# Patient Record
Sex: MALE | Race: WHITE | NOT HISPANIC OR LATINO | Employment: PART TIME | URBAN - METROPOLITAN AREA
[De-identification: names, ages, dates, MRNs, and addresses within clinical notes are randomized per-mention and may not be internally consistent; named-entity substitution may affect disease eponyms.]

---

## 2022-02-19 ENCOUNTER — HOSPITAL ENCOUNTER (EMERGENCY)
Facility: HOSPITAL | Age: 52
Discharge: HOME | End: 2022-02-19
Attending: EMERGENCY MEDICINE | Admitting: EMERGENCY MEDICINE
Payer: OTHER MISCELLANEOUS

## 2022-02-19 ENCOUNTER — APPOINTMENT (EMERGENCY)
Dept: RADIOLOGY | Facility: HOSPITAL | Age: 52
End: 2022-02-19
Payer: OTHER MISCELLANEOUS

## 2022-02-19 VITALS
OXYGEN SATURATION: 94 % | SYSTOLIC BLOOD PRESSURE: 157 MMHG | WEIGHT: 250 LBS | TEMPERATURE: 97.9 F | RESPIRATION RATE: 18 BRPM | DIASTOLIC BLOOD PRESSURE: 77 MMHG | BODY MASS INDEX: 40.18 KG/M2 | HEIGHT: 66 IN | HEART RATE: 73 BPM

## 2022-02-19 DIAGNOSIS — S43.014A ANTERIOR DISLOCATION OF RIGHT SHOULDER, INITIAL ENCOUNTER: Primary | ICD-10-CM

## 2022-02-19 PROCEDURE — 96374 THER/PROPH/DIAG INJ IV PUSH: CPT

## 2022-02-19 PROCEDURE — 99285 EMERGENCY DEPT VISIT HI MDM: CPT | Performed by: NURSE PRACTITIONER

## 2022-02-19 PROCEDURE — 73030 X-RAY EXAM OF SHOULDER: CPT

## 2022-02-19 PROCEDURE — 23650 CLTX SHO DSLC W/MNPJ WO ANES: CPT | Performed by: NURSE PRACTITIONER

## 2022-02-19 PROCEDURE — 96375 TX/PRO/DX INJ NEW DRUG ADDON: CPT

## 2022-02-19 PROCEDURE — 99283 EMERGENCY DEPT VISIT LOW MDM: CPT

## 2022-02-19 RX ORDER — AMLODIPINE BESYLATE 5 MG/1
5 TABLET ORAL DAILY
COMMUNITY

## 2022-02-19 RX ORDER — ONDANSETRON 2 MG/ML
4 INJECTION INTRAMUSCULAR; INTRAVENOUS ONCE
Status: COMPLETED | OUTPATIENT
Start: 2022-02-19 | End: 2022-02-19

## 2022-02-19 RX ORDER — HYDROMORPHONE HCL/PF 1 MG/ML
1 SYRINGE (ML) INJECTION ONCE
Status: COMPLETED | OUTPATIENT
Start: 2022-02-19 | End: 2022-02-19

## 2022-02-19 RX ADMIN — HYDROMORPHONE HYDROCHLORIDE 1 MG: 1 INJECTION, SOLUTION INTRAMUSCULAR; INTRAVENOUS; SUBCUTANEOUS at 16:46

## 2022-02-19 RX ADMIN — ONDANSETRON 4 MG: 2 INJECTION INTRAMUSCULAR; INTRAVENOUS at 16:45

## 2022-02-19 NOTE — ED ATTENDING ATTESTATION
2/19/2022  IMarek MD, saw and evaluated the patient  I have discussed the patient with the resident/non-physician practitioner and agree with the resident's/non-physician practitioner's findings, Plan of Care, and MDM as documented in the resident's/non-physician practitioner's note, except where noted  All available labs and Radiology studies were reviewed  I was present for key portions of any procedure(s) performed by the resident/non-physician practitioner and I was immediately available to provide assistance  At this point I agree with the current assessment done in the Emergency Department

## 2022-02-19 NOTE — Clinical Note
Amparo Dumont was seen and treated in our emergency department on 2/19/2022  Diagnosis:     Laron Dailey  may return to gym or sports with limited activity until return date  He may return on this date: 03/19/2022         If you have any questions or concerns, please don't hesitate to call        Madeline Rivas MD    ______________________________           _______________          _______________  Hospital Representative                              Date                                Time

## 2022-02-19 NOTE — ED PROVIDER NOTES
History  Chief Complaint   Patient presents with    Shoulder Injury     right shoulder injury while practicing for      49-year-old male patient presents here with right shoulder injury skiing today  He has what appears to be an apparent dislocation with a positive salcus sign  He is neurovascularly intact distally      Fall  Mechanism of injury: fall    Injury location:  Shoulder/arm  Shoulder/arm injury location:  R upper arm  Fall:     Fall occurred:  Skiing/snowboarding    Impact surface:  Snow      Prior to Admission Medications   Prescriptions Last Dose Informant Patient Reported? Taking? amLODIPine (NORVASC) 5 mg tablet   Yes Yes   Sig: Take 5 mg by mouth daily      Facility-Administered Medications: None       Past Medical History:   Diagnosis Date    Hypertension        History reviewed  No pertinent surgical history  History reviewed  No pertinent family history  I have reviewed and agree with the history as documented      E-Cigarette/Vaping    E-Cigarette Use Never User      E-Cigarette/Vaping Substances     Social History     Tobacco Use    Smoking status: Never Smoker    Smokeless tobacco: Never Used   Vaping Use    Vaping Use: Never used   Substance Use Topics    Alcohol use: Not Currently    Drug use: Never       Review of Systems    Physical Exam  Physical Exam    Vital Signs  ED Triage Vitals [02/19/22 1616]   Temperature Pulse Respirations Blood Pressure SpO2   97 9 °F (36 6 °C) 73 18 157/77 94 %      Temp Source Heart Rate Source Patient Position - Orthostatic VS BP Location FiO2 (%)   Temporal Monitor Sitting Left arm --      Pain Score       7           Vitals:    02/19/22 1616   BP: 157/77   Pulse: 73   Patient Position - Orthostatic VS: Sitting         Visual Acuity      ED Medications  Medications   ondansetron (ZOFRAN) injection 4 mg (4 mg Intravenous Given 2/19/22 1645)   HYDROmorphone (DILAUDID) injection 1 mg (1 mg Intravenous Given 2/19/22 1646)       Diagnostic Studies  Results Reviewed     None                 XR shoulder 2+ views RIGHT    (Results Pending)   XR shoulder 2+ views RIGHT    (Results Pending)              Procedures  Orthopedic injury treatment    Date/Time: 2/19/2022 5:30 PM  Performed by: REHAN Rosas  Authorized by: REHAN Rosas     Patient Location:  ED  Other Assisting Provider: Yes (comment)    Verbal consent obtained?: Yes    Emergent situation    Risks and benefits: Risks, benefits and alternatives were discussed    Consent given by:  Patient  Patient states understanding of procedure being performed: Yes    Patient identity confirmed:  Verbally with patient and arm band  Injury location:  Shoulder  Location details:  Right shoulder  Injury type:  Dislocation  Dislocation type: anterior    Chronicity:  Recurrent  Hill-Sachs deformity?: No    Neurovascular status: Neurovascularly intact    Distal perfusion: normal    Neurological function: normal    Range of motion: reduced    Local anesthesia used?: No    General anesthesia used?: No    Manipulation performed?: Yes    Reduction method: FARES  Reduction method: FARES  Reduction method: FARES  Reduction method: FARES  Reduction method: FARES  Reduction method: FARES  Reduction successful?: Yes    Confirmation: Reduction confirmed by x-ray    Immobilization:  Sling  Neurovascular status: Neurovascularly intact    Distal perfusion: normal    Neurological function: normal    Range of motion: normal    Patient tolerance:  Patient tolerated the procedure well with no immediate complications             ED Course                               SBIRT 20yo+      Most Recent Value   SBIRT (24 yo +)    In order to provide better care to our patients, we are screening all of our patients for alcohol and drug use  Would it be okay to ask you these screening questions?  No Filed at: 02/19/2022 1706                    MDM  Number of Diagnoses or Management Options  Anterior dislocation of right shoulder, initial encounter: new and requires workup     Amount and/or Complexity of Data Reviewed  Tests in the radiology section of CPT®: reviewed and ordered  Independent visualization of images, tracings, or specimens: yes    Patient Progress  Patient progress: improved      Disposition  Final diagnoses:   Anterior dislocation of right shoulder, initial encounter     Time reflects when diagnosis was documented in both MDM as applicable and the Disposition within this note     Time User Action Codes Description Comment    2/19/2022  5:23 PM Yary Byrne Add [S43 014A] Anterior dislocation of right shoulder, initial encounter       ED Disposition     ED Disposition Condition Date/Time Comment    Discharge Stable Sat Feb 19, 2022  5:23 PM Curtis Pardo discharge to home/self care  Follow-up Information     Follow up With Specialties Details Why [address], DO Orthopedic Surgery In 1 day  [address] [address]  [phone]            Discharge Medication List as of 2/19/2022  5:23 PM      CONTINUE these medications which have NOT CHANGED    Details   amLODIPine (NORVASC) 5 mg tablet Take 5 mg by mouth daily, Historical Med             No discharge procedures on file      PDMP Review     None          ED Provider  Electronically Signed by           REHAN Mckinley  02/19/22 1882

## 2022-02-26 ENCOUNTER — APPOINTMENT (EMERGENCY)
Dept: RADIOLOGY | Facility: HOSPITAL | Age: 52
End: 2022-02-26
Payer: OTHER MISCELLANEOUS

## 2022-02-26 ENCOUNTER — HOSPITAL ENCOUNTER (EMERGENCY)
Facility: HOSPITAL | Age: 52
Discharge: HOME | End: 2022-02-26
Attending: FAMILY MEDICINE | Admitting: FAMILY MEDICINE
Payer: OTHER MISCELLANEOUS

## 2022-02-26 VITALS
TEMPERATURE: 97.8 F | OXYGEN SATURATION: 96 % | RESPIRATION RATE: 18 BRPM | SYSTOLIC BLOOD PRESSURE: 181 MMHG | WEIGHT: 250 LBS | HEART RATE: 74 BPM | DIASTOLIC BLOOD PRESSURE: 97 MMHG | BODY MASS INDEX: 40.35 KG/M2

## 2022-02-26 DIAGNOSIS — S43.014A ANTERIOR DISLOCATION OF RIGHT SHOULDER, INITIAL ENCOUNTER: Primary | ICD-10-CM

## 2022-02-26 PROCEDURE — 73020 X-RAY EXAM OF SHOULDER: CPT

## 2022-02-26 PROCEDURE — 96374 THER/PROPH/DIAG INJ IV PUSH: CPT

## 2022-02-26 PROCEDURE — 99285 EMERGENCY DEPT VISIT HI MDM: CPT | Performed by: FAMILY MEDICINE

## 2022-02-26 PROCEDURE — 73030 X-RAY EXAM OF SHOULDER: CPT

## 2022-02-26 PROCEDURE — 96376 TX/PRO/DX INJ SAME DRUG ADON: CPT

## 2022-02-26 PROCEDURE — 23650 CLTX SHO DSLC W/MNPJ WO ANES: CPT | Performed by: FAMILY MEDICINE

## 2022-02-26 PROCEDURE — 99152 MOD SED SAME PHYS/QHP 5/>YRS: CPT | Performed by: FAMILY MEDICINE

## 2022-02-26 PROCEDURE — 99283 EMERGENCY DEPT VISIT LOW MDM: CPT

## 2022-02-26 RX ORDER — ETOMIDATE 2 MG/ML
5 INJECTION INTRAVENOUS ONCE
Status: DISCONTINUED | OUTPATIENT
Start: 2022-02-26 | End: 2022-02-26 | Stop reason: HOSPADM

## 2022-02-26 RX ORDER — MIDAZOLAM HYDROCHLORIDE 2 MG/2ML
5 INJECTION, SOLUTION INTRAMUSCULAR; INTRAVENOUS ONCE
Status: COMPLETED | OUTPATIENT
Start: 2022-02-26 | End: 2022-02-26

## 2022-02-26 RX ORDER — ETOMIDATE 2 MG/ML
INJECTION INTRAVENOUS
Status: DISCONTINUED
Start: 2022-02-26 | End: 2022-02-26 | Stop reason: HOSPADM

## 2022-02-26 RX ORDER — HYDROMORPHONE HCL/PF 1 MG/ML
1 SYRINGE (ML) INJECTION ONCE
Status: COMPLETED | OUTPATIENT
Start: 2022-02-26 | End: 2022-02-26

## 2022-02-26 RX ADMIN — MIDAZOLAM 5 MG: 1 INJECTION INTRAMUSCULAR; INTRAVENOUS at 13:13

## 2022-02-26 RX ADMIN — MIDAZOLAM HYDROCHLORIDE 5 MG: 1 INJECTION, SOLUTION INTRAMUSCULAR; INTRAVENOUS at 12:15

## 2022-02-26 RX ADMIN — HYDROMORPHONE HYDROCHLORIDE 1 MG: 1 INJECTION, SOLUTION INTRAMUSCULAR; INTRAVENOUS; SUBCUTANEOUS at 12:00

## 2022-02-26 RX ADMIN — HYDROMORPHONE HYDROCHLORIDE 1 MG: 1 INJECTION, SOLUTION INTRAMUSCULAR; INTRAVENOUS; SUBCUTANEOUS at 12:55

## 2022-02-26 NOTE — Clinical Note
Meg Pena was seen and treated in our emergency department on 2/26/2022  Diagnosis:     Boneta Big    He may return on this date: If you have any questions or concerns, please don't hesitate to call        Xenia Chapman MD    ______________________________           _______________          _______________  [facility] Representative                              Date                                Time

## 2022-02-26 NOTE — Clinical Note
Chris Giagabrielle was seen and treated in our emergency department on 2/26/2022  limited to 5pounds just desk duty    Diagnosis: Right shoulder dislocation    Valdemar Pedroza     He may return on this date: If you have any questions or concerns, please don't hesitate to call        Robin Lowery MD    ______________________________           _______________          _______________  [facility] Representative                              Date                                Time

## 2022-02-26 NOTE — Clinical Note
Elio Sims was seen and treated in our emergency department on 2/26/2022  Diagnosis:     Christine Roberts    He may return on this date: If you have any questions or concerns, please don't hesitate to call        Peter Nevarez MD    ______________________________           _______________          _______________  [facility] Representative                              Date                                Time

## 2022-02-26 NOTE — ED PROVIDER NOTES
History  Chief Complaint   Patient presents with    Shoulder Injury     HPI  THIS 54-YEAR-OLD MALE presented to ED with the right shoulder dislocation  Patient states he was at work reaching to grab something when he had shoulder dislocation  Patient states this is his 3rd time in the ED within a week for similar complain  Visible deformity noted  Awake alert oriented x3 GCS 15  Denies any numbness or tingling  Denies any chest pain or shortness of breath  Prior to Admission Medications   Prescriptions Last Dose Informant Patient Reported? Taking? amLODIPine (NORVASC) 5 mg tablet   Yes No   Sig: Take 5 mg by mouth daily      Facility-Administered Medications: None       Past Medical History:   Diagnosis Date    Hypertension        History reviewed  No pertinent surgical history  History reviewed  No pertinent family history  I have reviewed and agree with the history as documented  E-Cigarette/Vaping    E-Cigarette Use Never User      E-Cigarette/Vaping Substances     Social History     Tobacco Use    Smoking status: Never Smoker    Smokeless tobacco: Never Used   Vaping Use    Vaping Use: Never used   Substance Use Topics    Alcohol use: Not Currently    Drug use: Never       Review of Systems   Constitutional: Negative  Respiratory: Negative  Cardiovascular: Negative  Genitourinary: Negative  Musculoskeletal: Negative for back pain and joint swelling  Right shoulder pain dislocation   Neurological: Negative  Psychiatric/Behavioral: Negative  Physical Exam  Physical Exam  Vitals and nursing note reviewed  Constitutional:       Appearance: Normal appearance  HENT:      Head: Normocephalic and atraumatic  Right Ear: External ear normal       Left Ear: External ear normal    Eyes:      Extraocular Movements: Extraocular movements intact  Conjunctiva/sclera: Conjunctivae normal       Pupils: Pupils are equal, round, and reactive to light  Cardiovascular:      Rate and Rhythm: Normal rate and regular rhythm  Pulmonary:      Effort: Pulmonary effort is normal       Breath sounds: Normal breath sounds  Musculoskeletal:         General: Deformity present  Cervical back: Normal range of motion and neck supple  Skin:     General: Skin is warm  Neurological:      General: No focal deficit present  Mental Status: He is alert and oriented to person, place, and time  Psychiatric:         Mood and Affect: Mood normal          Behavior: Behavior normal          Vital Signs  ED Triage Vitals [02/26/22 1116]   Temperature Pulse Respirations Blood Pressure SpO2   97 8 °F (36 6 °C) 74 18 (!) 181/97 96 %      Temp Source Heart Rate Source Patient Position - Orthostatic VS BP Location FiO2 (%)   Temporal Monitor Sitting Left arm --      Pain Score       7           Vitals:    02/26/22 1116   BP: (!) 181/97   Pulse: 74   Patient Position - Orthostatic VS: Sitting         Visual Acuity      ED Medications  Medications   etomidate (AMIDATE) 2 mg/mL injection **ADS Override Pull** (has no administration in time range)   etomidate (AMIDATE) 2 mg/mL injection 5 mg (has no administration in time range)   HYDROmorphone (DILAUDID) injection 1 mg (1 mg Intravenous Given 2/26/22 1200)   midazolam (VERSED) injection 5 mg (5 mg Intravenous Given 2/26/22 1215)   HYDROmorphone (DILAUDID) injection 1 mg (1 mg Intravenous Given 2/26/22 1255)   midazolam (VERSED) injection 5 mg (5 mg Intravenous Given 2/26/22 1313)       Diagnostic Studies  Results Reviewed     None                 XR shoulder 2+ views RIGHT    (Results Pending)   XR shoulder 2+ views RIGHT    (Results Pending)   XR shoulder 1 vw RIGHT POST REDUCTION    (Results Pending)              Procedures  Pre-Procedural Sedation  Performed by: Kari Gomez MD  Authorized by: Kari Gomez MD     Consent:     Consent obtained:  Verbal and written    Consent given by:  Patient    Risks discussed:   Allergic reaction, prolonged hypoxia resulting in organ damage, prolonged sedation necessitating reversal, respiratory compromise necessitating ventilatory assistance and intubation, vomiting, nausea, inadequate sedation and dysrhythmia    Alternatives discussed:  Analgesia without sedation  Universal protocol:     Procedure explained and questions answered to patient or proxy's satisfaction: yes      Relevant documents present and verified: yes      Test results available and properly labeled: yes      Radiology Images displayed and confirmed    If images not available, report reviewed: yes      Required blood products, implants, devices, and special equipment available: yes      Site/side marked: yes      Immediately prior to procedure a time out was called: yes      Patient identity confirmation method:  Verbally with patient and arm band  Indications:     Sedation purpose:  Dislocation reduction    Procedure necessitating sedation performed by:  Physician performing sedation    Intended level of sedation:  Moderate (conscious sedation)  Pre-sedation assessment:     Time since last food or drink:  5 hours    ASA classification: class 1 - normal, healthy patient      Neck mobility: normal      Mouth opening:  3 or more finger widths    Mallampati score:  I - soft palate, uvula, fauces, pillars visible    Pre-sedation assessments completed and reviewed: airway patency not reviewed, cardiovascular function not reviewed, hydration status not reviewed, mental status not reviewed, nausea/vomiting not reviewed, pain level not reviewed, respiratory function not reviewed and temperature not reviewed      History of difficult intubation: no      Pre-sedation assessment completed:  2/26/2022 1:10 PM  Procedural Sedation    Date/Time: 2/26/2022 1:12 PM  Performed by: Hank Guzman MD  Authorized by: Hank Guzman MD     Immediate pre-procedure details:     Reassessment: Patient reassessed immediately prior to procedure      Reviewed: vital signs, relevant labs/tests and NPO status      Verified: bag valve mask available, emergency equipment available, intubation equipment available, IV patency confirmed, oxygen available, reversal medications available and suction available    Procedure details (see MAR for exact dosages):     Sedation start time:  2/26/2022 1:12 PM    Preoxygenation:  Room air and nasal cannula    Sedation:  Etomidate    Analgesia:  Hydromorphone    Intra-procedure monitoring:  Blood pressure monitoring, cardiac monitor, continuous capnometry, continuous pulse oximetry, frequent vital sign checks and frequent LOC assessments    Intra-procedure events: none      Sedation end time:  2/26/2022 1:55 PM    Total sedation time (minutes):  30  Post-procedure details:     Post-sedation assessment completed:  2/26/2022 2:33 PM    Attendance: Constant attendance by certified staff until patient recovered      Recovery: Patient returned to pre-procedure baseline      Post-sedation assessments completed and reviewed: post-procedure airway patency not reviewed, post-procedure cardiovascular function not reviewed, post-procedure hydration status not reviewed, post-procedure mental status not reviewed, post-procedure nausea and vomiting status not reviewed, pain score not reviewed, post-procedure respiratory function not reviewed and post-procedure temperature not reviewed      Patient is stable for discharge or admission: yes      Patient tolerance:   Tolerated well, no immediate complications  Orthopedic injury treatment    Date/Time: 2/26/2022 2:56 PM  Performed by: Peter Nevarez MD  Authorized by: Peter Nevarez MD     Patient Location:  ED  Other Assisting Provider: Yes (comment)    Verbal consent obtained?: Yes    Written consent obtained?: No    Risks and benefits: Risks, benefits and alternatives were discussed    Consent given by:  Patient  Patient states understanding of procedure being performed: Yes    Patient's understanding of procedure matches consent: Yes    Procedure consent matches procedure scheduled: Yes    Relevant documents present and verified: Yes    Test results available and properly labeled: Yes    Site marked: Yes    Radiology Images displayed and confirmed  If images not available, report reviewed: Yes    Patient identity confirmed:  Verbally with patient and arm band  Time out: Immediately prior to the procedure a time out was called    Injury location:  Shoulder  Location details:  Right shoulder  Injury type:  Dislocation  Dislocation type: anterior    Chronicity:  Recurrent  Hill-Sachs deformity?: No    Neurovascular status: Neurovascularly intact    Distal perfusion: normal    Neurological function: normal    Range of motion: normal    Local anesthesia used?: No    General anesthesia used?: No    Sedation type: Moderate (conscious) sedation (See separate Procedural Sedation form)  Manipulation performed?: Yes    Reduction method:  External rotation and traction and counter traction  Reduction method:  External rotation and traction and counter traction  Reduction method:  External rotation and traction and counter traction  Reduction method:  External rotation and traction and counter traction  Reduction method:  External rotation and traction and counter traction  Reduction method:  External rotation and traction and counter traction  Skeletal traction used?: Yes    Reduction successful?: Yes    Confirmation: Reduction confirmed by x-ray    Immobilization:  Sling  Neurovascular status: Neurovascularly intact    Distal perfusion: normal    Neurological function: normal    Range of motion: normal    Patient tolerance:  Patient tolerated the procedure well with no immediate complications             ED Course      discussed with shoulder reduction  Patient is at baseline recommending follow-up with orthopedic and light duty patient provided with the note    Precaution provided regarding return                                        MDM    Disposition  Final diagnoses:   Anterior dislocation of right shoulder, initial encounter     Time reflects when diagnosis was documented in both MDM as applicable and the Disposition within this note     Time User Action Codes Description Comment    2/26/2022  2:54 PM Janice Radha Add [S43 014A] Anterior dislocation of right shoulder, initial encounter       ED Disposition     ED Disposition Condition Date/Time Comment    Discharge Stable Sat Feb 26, 2022  2:54 PM Zulma Contreras discharge to home/self care              Follow-up Information    None         Discharge Medication List as of 2/26/2022  2:56 PM      CONTINUE these medications which have NOT CHANGED    Details   amLODIPine (NORVASC) 5 mg tablet Take 5 mg by mouth daily, Historical Med                 PDMP Review     None          ED Provider  Electronically Signed by           Marco Giordano MD  02/26/22 4669

## 2022-03-07 NOTE — QUICK NOTE
The purpose of this note is complete the ROS and PE related to previous visit on 02/19/22  ROS:   Positive for right shoulder deformity  All other ROS negative  PE:        /77 (BP Location: Left arm)   Pulse 73   Temp 97 9 °F (36 6 °C) (Temporal)   Resp 18   Ht 5' 6" (1 676 m)   Wt 113 kg (250 lb)   SpO2 94%   BMI 40 35 kg/m²     General Appearance:    Alert, cooperative, mild distress   Head:    Normocephalic, without obvious abnormality, atraumatic   Eyes:    PERRL   Ears:    Normal TM's and external ear canals   Nose:    Clear, atraumatic   Throat:   Lips, mucosa, and tongue normal; teeth and gums normal   Neck:   Supple, symmetrical, trachea midline, No cervical tenderness  Back:     Symmetric, no curvature, ROM normal, no CVA tenderness   Lungs:     Clear to auscultation bilaterally, respirations unlabored   Chest wall:    No tenderness or deformity   Heart:    Regular rate and rhythm   Abdomen:     Soft non-tender, non-distended  Genitalia:     NA   Rectal:     NA   Extremities:   Right shoulder positive sulcus sign   Pulses:   2+ and symmetric all extremities   Skin:   Warm, dry   Lymph nodes:    NA   Neurologic:   Extremity sensation intact         [place] Organ Right Shoulder Exam     Range of Motion   Active abduction: abnormal   Passive abduction: abnormal   Extension: abnormal   External rotation: abnormal   Forward flexion: abnormal     Other   Sensation: normal  Pulse: present        MDM: Positive right anterior shoulder dislocation successfully reduced with analgesia and without sedation  Placed in a shoulder immobilizer/sling  Follow up with orthopedics